# Patient Record
Sex: MALE | Race: WHITE | Employment: OTHER | URBAN - METROPOLITAN AREA
[De-identification: names, ages, dates, MRNs, and addresses within clinical notes are randomized per-mention and may not be internally consistent; named-entity substitution may affect disease eponyms.]

---

## 2017-04-07 ENCOUNTER — HOSPITAL ENCOUNTER (OUTPATIENT)
Dept: LAB | Age: 57
Discharge: HOME | End: 2017-04-07
Attending: INTERNAL MEDICINE
Payer: COMMERCIAL

## 2017-04-07 ENCOUNTER — HOSPITAL ENCOUNTER (OUTPATIENT)
Dept: ULTRASOUND IMAGING | Age: 57
Discharge: HOME | End: 2017-04-07
Attending: INTERNAL MEDICINE
Payer: COMMERCIAL

## 2017-04-07 DIAGNOSIS — K70.30 ALCOHOLIC CIRRHOSIS OF LIVER (HCC): ICD-10-CM

## 2017-04-07 LAB — SENTARA SPECIMEN COL,SENBCF: NORMAL

## 2017-04-07 PROCEDURE — 99001 SPECIMEN HANDLING PT-LAB: CPT | Performed by: INTERNAL MEDICINE

## 2017-04-07 PROCEDURE — 76705 ECHO EXAM OF ABDOMEN: CPT

## 2017-04-11 ENCOUNTER — HOSPITAL ENCOUNTER (OUTPATIENT)
Dept: LAB | Age: 57
Discharge: HOME | End: 2017-04-11

## 2017-04-11 ENCOUNTER — OFFICE VISIT (OUTPATIENT)
Dept: INTERNAL MEDICINE CLINIC | Age: 57
End: 2017-04-11

## 2017-04-11 VITALS
DIASTOLIC BLOOD PRESSURE: 78 MMHG | RESPIRATION RATE: 16 BRPM | HEART RATE: 70 BPM | TEMPERATURE: 98 F | BODY MASS INDEX: 25.18 KG/M2 | HEIGHT: 69 IN | OXYGEN SATURATION: 98 % | WEIGHT: 170 LBS | SYSTOLIC BLOOD PRESSURE: 137 MMHG

## 2017-04-11 DIAGNOSIS — Z00.00 PHYSICAL EXAM: Primary | ICD-10-CM

## 2017-04-11 DIAGNOSIS — R07.89 ATYPICAL CHEST PAIN: ICD-10-CM

## 2017-04-11 PROCEDURE — 99001 SPECIMEN HANDLING PT-LAB: CPT | Performed by: INTERNAL MEDICINE

## 2017-04-11 RX ORDER — HYDROCORTISONE ACETATE PRAMOXINE HCL 2.5; 1 G/100G; G/100G
CREAM TOPICAL 2 TIMES DAILY
Qty: 30 G | Refills: 3 | Status: SHIPPED | OUTPATIENT
Start: 2017-04-11 | End: 2017-04-12 | Stop reason: SDUPTHER

## 2017-04-11 NOTE — PROGRESS NOTES
Nasima Looney presents today for   Chief Complaint   Patient presents with    Well Male    Chest Pain     has been off and on for the last month       Nasima Looney preferred language for health care discussion is english/other. Is someone accompanying this pt? no    Is the patient using any DME equipment during OV? no    Depression Screening:  PHQ 2 / 9, over the last two weeks 4/11/2017 10/29/2015 10/6/2014   Little interest or pleasure in doing things Not at all Not at all Not at all   Feeling down, depressed or hopeless Not at all Not at all Not at all   Total Score PHQ 2 0 0 0       Learning Assessment:  Learning Assessment 4/27/2015   PRIMARY LEARNER Patient   HIGHEST LEVEL OF EDUCATION - PRIMARY LEARNER  2 YEARS OF COLLEGE   PRIMARY LANGUAGE ENGLISH   LEARNER PREFERENCE PRIMARY DEMONSTRATION   ANSWERED BY patient   RELATIONSHIP SELF       Abuse Screening:  Abuse Screening Questionnaire 4/27/2015   Do you ever feel afraid of your partner? N   Are you in a relationship with someone who physically or mentally threatens you? N   Is it safe for you to go home? Y       Fall Risk  No flowsheet data found. Health Maintenance reviewed and discussed per provider. Yes    Nasima Looney is due for colonoscopy. Please order/place referral if appropriate. Advance Directive:  1. Do you have an advance directive in place? Patient Reply: no    2. If not, would you like material regarding how to put one in place? Patient Reply: yes    Coordination of Care:  1. Have you been to the ER, urgent care clinic since your last visit? Hospitalized since your last visit? no    2. Have you seen or consulted any other health care providers outside of the [address] [place] [address] since your last visit? Include any pap smears or colon screening.  no

## 2017-04-11 NOTE — MR AVS SNAPSHOT
Visit Information Date & Time Provider Department Dept. Phone Encounter #  
 4/11/2017  7:00 AM [address] [phone] [id number] Follow-up Instructions Return in about 3 months (around 7/11/2017), or if symptoms worsen or fail to improve. Follow-up and Disposition History Upcoming Health Maintenance Date Due INFLUENZA AGE 9 TO ADULT 8/1/2016 FOBT Q 1 YEAR AGE 50-75 11/1/2024* DTaP/Tdap/Td series (2 - Td) 12/24/2024 *Topic was postponed. The date shown is not the original due date. Allergies as of 4/11/2017  Review Complete On: 4/11/2017 By: Eunice Marquez No Known Allergies Current Immunizations  Reviewed on 12/24/2014 Name Date Influenza Vaccine (Madin Ruby Canine Kidney) PF 10/22/2014  2:30 PM  
 Influenza Vaccine (Quad) 10/29/2015 Pneumococcal Polysaccharide (PPSV-23) 10/22/2014  2:27 PM  
 Tdap 12/24/2014 10:15 AM  
  
 Not reviewed this visit You Were Diagnosed With   
  
 Codes Comments Physical exam    -  Primary ICD-10-CM: Z00.00 ICD-9-CM: V70.9 Atypical chest pain     ICD-10-CM: R07.89 ICD-9-CM: 786.59 Vitals BP Pulse Temp Resp Height(growth percentile) Weight(growth percentile)  
 137/78 (BP 1 Location: Right arm, BP Patient Position: Sitting) 70 98 °F (36.7 °C) (Oral) 16 5' 9\" (1.753 m) 170 lb (77.1 kg) SpO2 BMI Smoking Status 98% 25.1 kg/m2 Former Smoker Vitals History BMI and BSA Data Body Mass Index Body Surface Area  
 25.1 kg/m 2 1.94 m 2 Preferred Pharmacy Pharmacy Name Phone [address] [facility], [address] [facility] [phone] Your Updated Medication List  
  
   
This list is accurate as of: 4/11/17  8:00 AM.  Always use your most recent med list.  
  
  
  
  
 BIOTIN PO Take  by mouth.  
  
 ciclopirox 8 % solution Commonly known as:  PENLAC Apply to affected nails bid CITRUCEL PO Take  by mouth.  
  
 efinaconazole Dunia topical solution Commonly known as:  JUBLIA  
USE AS DIRECTED FOLIC ACID PO Take  by mouth. hydrocortisone-pramoxine 2.5-1 % rectal cream  
Commonly known as:  ANALPRAM HC 2.5%-1% Insert  into rectum two (2) times a day. Multivit-Iron-Min-Folic Acid 7,653-42-6.3 unit-mg-mg chewable tablet Commonly known as:  CENTRUM Take 1 Tab by mouth daily. NEPHRO-ELINOR RX 1- mg-mg-mcg tablet Generic drug:  vit B Cmplx 3-FA-Vit C-Biotin TAKE 1 TABLET BY MOUTH EVERY DAY  
  
 OMEGA 3 PO Take  by mouth. spironolactone 100 mg tablet Commonly known as:  ALDACTONE  
two (2) times a day. VITAMIN B-1 PO Take  by mouth. Prescriptions Sent to Pharmacy Refills  
 hydrocortisone-pramoxine (ANALPRAM HC 2.5%-1%) 2.5-1 % rectal cream 3 Sig: Insert  into rectum two (2) times a day. Class: Normal  
 Pharmacy: [address] [place], [place] [address] [address] Ph #: [phone] Route: Rectal  
  
We Performed the Following AMB POC EKG ROUTINE W/ 12 LEADS, INTER & REP [58026 CPT(R)] CBC W/O DIFF [77409 CPT(R)] LIPID PANEL [25903 CPT(R)] METABOLIC PANEL, COMPREHENSIVE [92202 CPT(R)] PROSTATE SPECIFIC AG (PSA) [id number] CPT(R)] TSH 3RD GENERATION [72448 CPT(R)] Follow-up Instructions Return in about 3 months (around 7/11/2017), or if symptoms worsen or fail to improve. To-Do List   
 04/11/2017 Lab:  CBC W/O DIFF Around 04/11/2017 ECHO:  ECHO TTE STRESS EXERCISE TREADMILL COMP Around 04/11/2017 ECHO:  ECHO TTE STRESS EXRCSE COMP W OR WO CONTR   
  
 04/11/2017 Lab:  LIPID PANEL   
  
 04/11/2017 Lab:  METABOLIC PANEL, COMPREHENSIVE   
  
 04/11/2017 Lab:  PROSTATE SPECIFIC AG (PSA)   
  
 04/11/2017 Lab:  TSH 3RD GENERATION Introducing \[facility] [place] [facility]\"" & HEALTH SERVICES! Dear Carlos Nuñez: Thank you for requesting a [employer] account. Our records indicate that you already have an active [employer] account. You can access your account anytime at [email or web address] Did you know that you can access your hospital and ER discharge instructions at any time in [employer]? You can also review all of your test results from your hospital stay or ER visit. Additional Information If you have questions, please visit the Frequently Asked Questions section of the [employer] website at [email or web address] Remember, [employer] is NOT to be used for urgent needs. For medical emergencies, dial 911. Now available from your iPhone and Android! Please provide this summary of care documentation to your next provider. Your primary care clinician is listed as Geovanna Sandhu. If you have any questions after today's visit, please call [phone].

## 2017-04-11 NOTE — PROGRESS NOTES
HISTORY OF PRESENT ILLNESS  Alfredo Badillo is a 61 y.o. male. HPI Comments: 60 yo male here for CPE. C/o CP over past 6 weeks, gradually increasing. Not tightness or pressure. Describes as 'little shots going to left shoulder\". Lasts a few minutes (sporatic over the few minutes - lasts a few seconds but recurs). No exertional component, but not exercising currently. Hopes to begin this. No heartburn. No SOB, diaphoresis, nausea. No known CAD. Has gained 25 lbs over the past few years. H/o cirrhosis followed by GI. Has been stable on labs and US. Well Male   Associated symptoms include chest pain. Pertinent negatives include no headaches and no shortness of breath. Chest Pain (Angina)    Pertinent negatives include no cough, no dizziness, no fever, no headaches, no nausea, no palpitations, no shortness of breath and no vomiting. Past Medical History:   Diagnosis Date    Ascitic fluid     2014    [facility]     [facility]osis of liver ([facility]     History of alcoholism ([facility]     Hypertension     Kidney stone 12/30/2014     Current Outpatient Prescriptions on File Prior to Visit   Medication Sig Dispense Refill    hydrocortisone-pramoxine (ANALPRAM HC 2.5%-1%) 2.5-1 % rectal cream       spironolactone (ALDACTONE) 100 mg tablet two (2) times a day.  ciclopirox (PENLAC) 8 % solution Apply to affected nails bid 1 Bottle 5    efinaconazole (JUBLIA) breana topical solution USE AS DIRECTED 4 mL 2    NEPHRO-ELINOR RX 1- mg-mg-mcg tablet TAKE 1 TABLET BY MOUTH EVERY DAY 30 Tab 5    FOLIC ACID PO Take  by mouth.  BIOTIN PO Take  by mouth.  THIAMINE HCL (VITAMIN B-1 PO) Take  by mouth.  METHYLCELLULOSE (CITRUCEL PO) Take  by mouth.  FLAXSEED OIL (OMEGA 3 PO) Take  by mouth.  Multivit-Iron-Min-Folic Acid (CENTRUM) 1,089-67-5.5 unit-mg-mg chewable tablet Take 1 Tab by mouth daily. 60 Tab 5     No current facility-administered medications on file prior to visit.       Social History Substance Use Topics    Smoking status: Former Smoker     Quit date: 1/1/1997    Smokeless tobacco: Never Used    Alcohol use No     Review of Systems   Constitutional: Negative for chills, fever and weight loss. HENT: Negative for congestion. Eyes: Negative for blurred vision and pain. Respiratory: Negative for cough and shortness of breath. Cardiovascular: Positive for chest pain. Negative for palpitations and leg swelling. Gastrointestinal: Negative for blood in stool, heartburn, melena, nausea and vomiting. Genitourinary: Negative for frequency and urgency. Musculoskeletal: Negative for joint pain and myalgias. Skin: Negative for itching and rash. Neurological: Negative for dizziness, tingling and headaches. Psychiatric/Behavioral: Negative for depression. The patient is not nervous/anxious. Physical Exam   Constitutional: He is oriented to person, place, and time. He appears well-developed and well-nourished. No distress. /78 (BP 1 Location: Right arm, BP Patient Position: Sitting)  Pulse 70  Temp 98 °F (36.7 °C) (Oral)   Resp 16  Ht 5' 9\" (1.753 m)  Wt 170 lb (77.1 kg)  SpO2 98%  BMI 25.1 kg/m2     Eyes: EOM are normal. Right eye exhibits no discharge. Left eye exhibits no discharge. No scleral icterus. Neck: Neck supple. Cardiovascular: Normal rate, regular rhythm and normal heart sounds. Exam reveals no gallop and no friction rub. No murmur heard. Pulmonary/Chest: Effort normal and breath sounds normal. No respiratory distress. He has no wheezes. He has no rales. Abdominal: Soft. He exhibits no distension. There is no tenderness. There is no rebound and no guarding. Musculoskeletal: He exhibits no edema or tenderness. Lymphadenopathy:     He has no cervical adenopathy. Neurological: He is alert and oriented to person, place, and time. He exhibits normal muscle tone. Skin: Skin is warm and dry. Psychiatric: He has a normal mood and affect. His behavior is normal.     Lab Results   Component Value Date/Time    Cholesterol, total 158 10/29/2015 04:32 PM    HDL Cholesterol 84 10/29/2015 04:32 PM    LDL, calculated 66 10/29/2015 04:32 PM    VLDL, calculated 8 10/29/2015 04:32 PM    Triglyceride 42 10/29/2015 04:32 PM     Lab Results   Component Value Date/Time    WBC 6.4 10/29/2015 04:32 PM    HGB 12.7 10/29/2015 04:32 PM    HCT 36.3 10/29/2015 04:32 PM    PLATELET 151 90/32/1969 04:32 PM    MCV 92 10/29/2015 04:32 PM     Lab Results   Component Value Date/Time    TSH 4.45 10/22/2014 05:22 AM     ASSESSMENT and PLAN    ICD-10-CM ICD-9-CM    1. Physical exam Z00.00 V70.9 LIPID PANEL      METABOLIC PANEL, COMPREHENSIVE      CBC W/O DIFF      PROSTATE SPECIFIC AG (PSA)      TSH 3RD GENERATION   2. Atypical chest pain R07.89 786.59 AMB POC EKG ROUTINE W/ 12 LEADS, INTER & REP   Benign exam. EKG today borderline with ? LAE. Will further assess with stress echo. Can gradually begin to increase physical activity. Repeat labs today.

## 2017-04-12 LAB
ALBUMIN SERPL-MCNC: 4.9 G/DL (ref 3.5–5.5)
ALBUMIN/GLOB SERPL: 1.6 {RATIO} (ref 1.2–2.2)
ALP SERPL-CCNC: 61 IU/L (ref 39–117)
ALT SERPL-CCNC: 15 IU/L (ref 0–44)
AST SERPL-CCNC: 23 IU/L (ref 0–40)
BILIRUB SERPL-MCNC: 0.3 MG/DL (ref 0–1.2)
BUN SERPL-MCNC: 12 MG/DL (ref 6–24)
BUN/CREAT SERPL: 16 (ref 9–20)
CALCIUM SERPL-MCNC: 9.1 MG/DL (ref 8.7–10.2)
CHLORIDE SERPL-SCNC: 102 MMOL/L (ref 96–106)
CHOLEST SERPL-MCNC: 165 MG/DL (ref 100–199)
CO2 SERPL-SCNC: 22 MMOL/L (ref 18–29)
CREAT SERPL-MCNC: 0.77 MG/DL (ref 0.76–1.27)
ERYTHROCYTE [DISTWIDTH] IN BLOOD BY AUTOMATED COUNT: 13.3 % (ref 12.3–15.4)
GLOBULIN SER CALC-MCNC: 3 G/DL (ref 1.5–4.5)
GLUCOSE SERPL-MCNC: 86 MG/DL (ref 65–99)
HCT VFR BLD AUTO: 43.4 % (ref 37.5–51)
HDLC SERPL-MCNC: 78 MG/DL
HGB BLD-MCNC: 15 G/DL (ref 12.6–17.7)
INTERPRETATION, 910389: NORMAL
LDLC SERPL CALC-MCNC: 75 MG/DL (ref 0–99)
MCH RBC QN AUTO: 32.1 PG (ref 26.6–33)
MCHC RBC AUTO-ENTMCNC: 34.6 G/DL (ref 31.5–35.7)
MCV RBC AUTO: 93 FL (ref 79–97)
PLATELET # BLD AUTO: 175 X10E3/UL (ref 150–379)
POTASSIUM SERPL-SCNC: 3.7 MMOL/L (ref 3.5–5.2)
PROT SERPL-MCNC: 7.9 G/DL (ref 6–8.5)
PSA SERPL-MCNC: 0.7 NG/ML (ref 0–4)
RBC # BLD AUTO: 4.68 X10E6/UL (ref 4.14–5.8)
SODIUM SERPL-SCNC: 146 MMOL/L (ref 134–144)
TRIGL SERPL-MCNC: 59 MG/DL (ref 0–149)
TSH SERPL DL<=0.005 MIU/L-ACNC: 3.88 UIU/ML (ref 0.45–4.5)
VLDLC SERPL CALC-MCNC: 12 MG/DL (ref 5–40)
WBC # BLD AUTO: 5.7 X10E3/UL (ref 3.4–10.8)

## 2017-04-12 RX ORDER — HYDROCORTISONE ACETATE PRAMOXINE HCL 2.5; 1 G/100G; G/100G
CREAM TOPICAL 2 TIMES DAILY
Qty: 30 G | Refills: 3 | Status: SHIPPED | OUTPATIENT
Start: 2017-04-12 | End: 2017-12-18 | Stop reason: SDUPTHER

## 2017-04-12 NOTE — TELEPHONE ENCOUNTER
Requested Prescriptions     Pending Prescriptions Disp Refills    hydrocortisone-pramoxine (ANALPRAM HC 2.5%-1%) 2.5-1 % rectal cream 30 g 3     Sig: Insert  into rectum two (2) times a day.        Patient prefers [facility] pharmacy at [facility]

## 2017-04-18 ENCOUNTER — HOSPITAL ENCOUNTER (OUTPATIENT)
Dept: NON INVASIVE DIAGNOSTICS | Age: 57
Discharge: HOME | End: 2017-04-18
Attending: INTERNAL MEDICINE
Payer: COMMERCIAL

## 2017-04-18 DIAGNOSIS — R07.89 ATYPICAL CHEST PAIN: ICD-10-CM

## 2017-04-18 PROCEDURE — 93351 STRESS TTE COMPLETE: CPT

## 2017-10-09 ENCOUNTER — HOSPITAL ENCOUNTER (OUTPATIENT)
Dept: ULTRASOUND IMAGING | Age: 57
Discharge: HOME | End: 2017-10-09
Attending: INTERNAL MEDICINE
Payer: COMMERCIAL

## 2017-10-09 ENCOUNTER — HOSPITAL ENCOUNTER (OUTPATIENT)
Dept: LAB | Age: 57
Discharge: HOME | End: 2017-10-09
Attending: INTERNAL MEDICINE
Payer: COMMERCIAL

## 2017-10-09 DIAGNOSIS — K70.31 ALCOHOLIC CIRRHOSIS OF LIVER WITH ASCITES (HCC): ICD-10-CM

## 2017-10-09 LAB — SENTARA SPECIMEN COL,SENBCF: NORMAL

## 2017-10-09 PROCEDURE — 76705 ECHO EXAM OF ABDOMEN: CPT

## 2017-10-09 PROCEDURE — 99001 SPECIMEN HANDLING PT-LAB: CPT | Performed by: INTERNAL MEDICINE

## 2017-12-18 RX ORDER — HYDROCORTISONE ACETATE PRAMOXINE HCL 2.5; 1 G/100G; G/100G
CREAM TOPICAL
Qty: 30 G | Refills: 0 | Status: SHIPPED | OUTPATIENT
Start: 2017-12-18 | End: 2018-02-18 | Stop reason: SDUPTHER

## 2018-02-19 RX ORDER — HYDROCORTISONE ACETATE PRAMOXINE HCL 2.5; 1 G/100G; G/100G
CREAM TOPICAL
Qty: 30 G | Refills: 0 | Status: SHIPPED | OUTPATIENT
Start: 2018-02-19 | End: 2018-04-22 | Stop reason: SDUPTHER

## 2018-04-10 ENCOUNTER — HOSPITAL ENCOUNTER (OUTPATIENT)
Dept: ULTRASOUND IMAGING | Age: 58
Discharge: HOME | End: 2018-04-10
Attending: INTERNAL MEDICINE
Payer: COMMERCIAL

## 2018-04-10 ENCOUNTER — HOSPITAL ENCOUNTER (OUTPATIENT)
Dept: LAB | Age: 58
Discharge: HOME | End: 2018-04-10

## 2018-04-10 DIAGNOSIS — K70.31 ALCOHOLIC CIRRHOSIS OF LIVER WITH ASCITES (HCC): ICD-10-CM

## 2018-04-10 LAB — SENTARA SPECIMEN COL,SENBCF: NORMAL

## 2018-04-10 PROCEDURE — 99001 SPECIMEN HANDLING PT-LAB: CPT | Performed by: INTERNAL MEDICINE

## 2018-04-10 PROCEDURE — 76705 ECHO EXAM OF ABDOMEN: CPT

## 2018-04-23 RX ORDER — HYDROCORTISONE ACETATE PRAMOXINE HCL 2.5; 1 G/100G; G/100G
CREAM TOPICAL
Qty: 30 G | Refills: 0 | Status: SHIPPED | OUTPATIENT
Start: 2018-04-23 | End: 2018-06-17 | Stop reason: SDUPTHER

## 2018-06-17 RX ORDER — HYDROCORTISONE ACETATE PRAMOXINE HCL 2.5; 1 G/100G; G/100G
CREAM TOPICAL
Qty: 30 G | Refills: 0 | Status: SHIPPED | OUTPATIENT
Start: 2018-06-17 | End: 2018-08-17 | Stop reason: SDUPTHER

## 2018-08-17 RX ORDER — HYDROCORTISONE ACETATE PRAMOXINE HCL 2.5; 1 G/100G; G/100G
CREAM TOPICAL
Qty: 30 G | Refills: 0 | Status: SHIPPED | OUTPATIENT
Start: 2018-08-17 | End: 2018-10-20 | Stop reason: SDUPTHER

## 2018-10-15 ENCOUNTER — HOSPITAL ENCOUNTER (OUTPATIENT)
Dept: LAB | Age: 58
Discharge: HOME | End: 2018-10-15

## 2018-10-15 LAB — SENTARA SPECIMEN COL,SENBCF: NORMAL

## 2018-10-15 PROCEDURE — 99001 SPECIMEN HANDLING PT-LAB: CPT | Performed by: INTERNAL MEDICINE

## 2018-10-16 ENCOUNTER — HOSPITAL ENCOUNTER (OUTPATIENT)
Dept: ULTRASOUND IMAGING | Age: 58
Discharge: HOME | End: 2018-10-16
Attending: INTERNAL MEDICINE
Payer: COMMERCIAL

## 2018-10-16 DIAGNOSIS — K70.31 ALCOHOLIC CIRRHOSIS OF LIVER WITH ASCITES (HCC): ICD-10-CM

## 2018-10-16 PROCEDURE — 76705 ECHO EXAM OF ABDOMEN: CPT

## 2018-10-22 RX ORDER — HYDROCORTISONE ACETATE PRAMOXINE HCL 2.5; 1 G/100G; G/100G
CREAM TOPICAL
Qty: 30 G | Refills: 0 | Status: SHIPPED | OUTPATIENT
Start: 2018-10-22 | End: 2019-01-04 | Stop reason: SDUPTHER

## 2019-01-04 RX ORDER — HYDROCORTISONE ACETATE PRAMOXINE HCL 2.5; 1 G/100G; G/100G
CREAM TOPICAL
Qty: 30 G | Refills: 0 | Status: SHIPPED | OUTPATIENT
Start: 2019-01-04